# Patient Record
Sex: FEMALE | URBAN - METROPOLITAN AREA
[De-identification: names, ages, dates, MRNs, and addresses within clinical notes are randomized per-mention and may not be internally consistent; named-entity substitution may affect disease eponyms.]

---

## 2020-11-10 ENCOUNTER — HOSPITAL ENCOUNTER (OUTPATIENT)
Dept: LAB | Age: 80
Discharge: HOME | End: 2020-11-10

## 2020-11-10 LAB
ALBUMIN SERPL-MCNC: 2.8 G/DL (ref 3.5–5)
ALBUMIN/GLOB SERPL: 1 {RATIO} (ref 1.1–2.2)
ALP SERPL-CCNC: 100 U/L (ref 45–117)
ALT SERPL-CCNC: 13 U/L (ref 12–78)
ANION GAP SERPL CALC-SCNC: 4 MMOL/L (ref 5–15)
AST SERPL W P-5'-P-CCNC: 18 U/L (ref 15–37)
BASOPHILS # BLD: 0.1 K/UL (ref 0–0.1)
BASOPHILS NFR BLD: 1 % (ref 0–1)
BILIRUB SERPL-MCNC: 0.4 MG/DL (ref 0.2–1)
BUN SERPL-MCNC: 21 MG/DL (ref 6–20)
BUN/CREAT SERPL: 18 (ref 12–20)
CA-I BLD-MCNC: 8.7 MG/DL (ref 8.5–10.1)
CHLORIDE SERPL-SCNC: 107 MMOL/L (ref 97–108)
CO2 SERPL-SCNC: 33 MMOL/L (ref 21–32)
CREAT SERPL-MCNC: 1.14 MG/DL (ref 0.55–1.02)
DIFFERENTIAL METHOD BLD: ABNORMAL
EOSINOPHIL # BLD: 0.2 K/UL (ref 0–0.4)
EOSINOPHIL NFR BLD: 3 % (ref 0–7)
ERYTHROCYTE [DISTWIDTH] IN BLOOD BY AUTOMATED COUNT: 13.2 % (ref 11.5–14.5)
GLOBULIN SER CALC-MCNC: 2.7 G/DL (ref 2–4)
GLUCOSE SERPL-MCNC: 93 MG/DL (ref 65–100)
HCT VFR BLD AUTO: 37.5 % (ref 35–47)
HGB BLD-MCNC: 11.3 G/DL (ref 11.5–16)
IMM GRANULOCYTES # BLD AUTO: 0.1 K/UL (ref 0–0.04)
IMM GRANULOCYTES NFR BLD AUTO: 1 % (ref 0–0.5)
LYMPHOCYTES # BLD: 1.9 K/UL (ref 0.8–3.5)
LYMPHOCYTES NFR BLD: 28 % (ref 12–49)
MCH RBC QN AUTO: 30.1 PG (ref 26–34)
MCHC RBC AUTO-ENTMCNC: 30.1 G/DL (ref 30–36.5)
MCV RBC AUTO: 100 FL (ref 80–99)
MONOCYTES # BLD: 0.9 K/UL (ref 0–1)
MONOCYTES NFR BLD: 12 % (ref 5–13)
NEUTS SEG # BLD: 3.9 K/UL (ref 1.8–8)
NEUTS SEG NFR BLD: 55 % (ref 32–75)
PLATELET # BLD AUTO: 332 K/UL (ref 150–400)
PMV BLD AUTO: 11.9 FL (ref 8.9–12.9)
POTASSIUM SERPL-SCNC: 4 MMOL/L (ref 3.5–5.1)
PREALB SERPL-MCNC: 15 MG/DL (ref 20–40)
PROT SERPL-MCNC: 5.5 G/DL (ref 6.4–8.2)
RBC # BLD AUTO: 3.75 M/UL (ref 3.8–5.2)
SODIUM SERPL-SCNC: 144 MMOL/L (ref 136–145)
WBC # BLD AUTO: 6.9 K/UL (ref 3.6–11)

## 2020-11-10 PROCEDURE — 84134 ASSAY OF PREALBUMIN: CPT

## 2020-11-10 PROCEDURE — 85025 COMPLETE CBC W/AUTO DIFF WBC: CPT

## 2020-11-10 PROCEDURE — 80053 COMPREHEN METABOLIC PANEL: CPT

## 2020-11-12 ENCOUNTER — HOSPITAL ENCOUNTER (OUTPATIENT)
Dept: LAB | Age: 80
Discharge: HOME | End: 2020-11-12

## 2020-11-12 LAB — MAGNESIUM SERPL-MCNC: 1.7 MG/DL (ref 1.6–2.4)

## 2020-11-12 PROCEDURE — 83735 ASSAY OF MAGNESIUM: CPT

## 2020-11-17 ENCOUNTER — HOSPITAL ENCOUNTER (OUTPATIENT)
Dept: LAB | Age: 80
Discharge: HOME | End: 2020-11-17

## 2020-11-17 LAB
ANION GAP SERPL CALC-SCNC: 5 MMOL/L (ref 5–15)
APPEARANCE UR: CLEAR
BACTERIA URNS QL MICRO: NEGATIVE /HPF
BASOPHILS NFR BLD: 0 % (ref 0–1)
BILIRUB UR QL: NEGATIVE
BUN SERPL-MCNC: 30 MG/DL (ref 6–20)
BUN/CREAT SERPL: 20 (ref 12–20)
CA-I BLD-MCNC: 8.8 MG/DL (ref 8.5–10.1)
CHLORIDE SERPL-SCNC: 107 MMOL/L (ref 97–108)
CO2 SERPL-SCNC: 30 MMOL/L (ref 21–32)
COLOR UR: YELLOW
CREAT SERPL-MCNC: 1.51 MG/DL (ref 0.55–1.02)
DIFFERENTIAL METHOD BLD: ABNORMAL
EOSINOPHIL NFR BLD: 1 % (ref 0–7)
ERYTHROCYTE [DISTWIDTH] IN BLOOD BY AUTOMATED COUNT: 13.3 % (ref 11.5–14.5)
GLUCOSE SERPL-MCNC: 95 MG/DL (ref 65–100)
GLUCOSE UR STRIP.AUTO-MCNC: NEGATIVE MG/DL
HCT VFR BLD AUTO: 36.5 % (ref 35–47)
HGB BLD-MCNC: 11.2 G/DL (ref 11.5–16)
HGB UR QL STRIP: NEGATIVE
IMM GRANULOCYTES NFR BLD AUTO: 0 % (ref 0–0.5)
KETONES UR QL STRIP.AUTO: NEGATIVE MG/DL
LEUKOCYTE ESTERASE UR QL STRIP.AUTO: NEGATIVE
LYMPHOCYTES NFR BLD: 22 % (ref 12–49)
MCH RBC QN AUTO: 30.4 PG (ref 26–34)
MCHC RBC AUTO-ENTMCNC: 30.7 G/DL (ref 30–36.5)
MCV RBC AUTO: 99.2 FL (ref 80–99)
MONOCYTES NFR BLD: 10 % (ref 5–13)
NEUTS SEG NFR BLD: 65 % (ref 32–75)
NITRITE UR QL STRIP.AUTO: NEGATIVE
PH UR STRIP: 5 [PH] (ref 5–8)
PLATELET # BLD AUTO: 352 K/UL (ref 150–400)
PMV BLD AUTO: 11.9 FL (ref 8.9–12.9)
POTASSIUM SERPL-SCNC: 3.8 MMOL/L (ref 3.5–5.1)
PROT UR STRIP-MCNC: NEGATIVE MG/DL
RBC # BLD AUTO: 3.68 M/UL (ref 3.8–5.2)
RBC #/AREA URNS HPF: 1 /HPF (ref 0–5)
SODIUM SERPL-SCNC: 142 MMOL/L (ref 136–145)
SP GR UR REFRACTOMETRY: 1.01 (ref 1–1.03)
UROBILINOGEN UR QL STRIP.AUTO: 0.1 EU/DL (ref 0.1–1)
WBC # BLD AUTO: 10 K/UL (ref 3.6–11)
WBC URNS QL MICRO: 1 /HPF (ref 0–4)

## 2020-11-17 PROCEDURE — 87086 URINE CULTURE/COLONY COUNT: CPT

## 2020-11-17 PROCEDURE — 80048 BASIC METABOLIC PNL TOTAL CA: CPT

## 2020-11-17 PROCEDURE — 81001 URINALYSIS AUTO W/SCOPE: CPT

## 2020-11-17 PROCEDURE — 85025 COMPLETE CBC W/AUTO DIFF WBC: CPT

## 2020-11-17 PROCEDURE — 87077 CULTURE AEROBIC IDENTIFY: CPT

## 2020-11-17 PROCEDURE — 87186 SC STD MICRODIL/AGAR DIL: CPT

## 2020-11-19 LAB
BACTERIA SPEC CULT: ABNORMAL
COLONY COUNT,CNT: ABNORMAL
COLONY COUNT,CNT: ABNORMAL
SPECIAL REQUESTS,SREQ: ABNORMAL

## 2023-08-29 ENCOUNTER — OFFICE VISIT (OUTPATIENT)
Age: 83
End: 2023-08-29
Payer: MEDICARE

## 2023-08-29 VITALS
HEIGHT: 66 IN | HEART RATE: 56 BPM | DIASTOLIC BLOOD PRESSURE: 76 MMHG | OXYGEN SATURATION: 97 % | SYSTOLIC BLOOD PRESSURE: 128 MMHG

## 2023-08-29 DIAGNOSIS — R42 DIZZINESS AND GIDDINESS: Primary | ICD-10-CM

## 2023-08-29 DIAGNOSIS — R25.1 TREMOR OF HANDS AND FACE: ICD-10-CM

## 2023-08-29 PROCEDURE — G8400 PT W/DXA NO RESULTS DOC: HCPCS | Performed by: NURSE PRACTITIONER

## 2023-08-29 PROCEDURE — G8421 BMI NOT CALCULATED: HCPCS | Performed by: NURSE PRACTITIONER

## 2023-08-29 PROCEDURE — 99204 OFFICE O/P NEW MOD 45 MIN: CPT | Performed by: NURSE PRACTITIONER

## 2023-08-29 PROCEDURE — 4004F PT TOBACCO SCREEN RCVD TLK: CPT | Performed by: NURSE PRACTITIONER

## 2023-08-29 PROCEDURE — 1123F ACP DISCUSS/DSCN MKR DOCD: CPT | Performed by: NURSE PRACTITIONER

## 2023-08-29 PROCEDURE — G8427 DOCREV CUR MEDS BY ELIG CLIN: HCPCS | Performed by: NURSE PRACTITIONER

## 2023-08-29 PROCEDURE — 1090F PRES/ABSN URINE INCON ASSESS: CPT | Performed by: NURSE PRACTITIONER

## 2023-08-29 RX ORDER — ALBUTEROL SULFATE 90 UG/1
AEROSOL, METERED RESPIRATORY (INHALATION)
COMMUNITY
Start: 2020-07-16

## 2023-08-29 RX ORDER — ATORVASTATIN CALCIUM 20 MG/1
TABLET, FILM COATED ORAL
COMMUNITY
Start: 2023-08-14

## 2023-08-29 RX ORDER — FLUTICASONE PROPIONATE 50 MCG
SPRAY, SUSPENSION (ML) NASAL
COMMUNITY
Start: 2023-06-01

## 2023-08-29 RX ORDER — DIAZEPAM 5 MG/1
5 TABLET ORAL SEE ADMIN INSTRUCTIONS
Qty: 3 TABLET | Refills: 0 | Status: SHIPPED | OUTPATIENT
Start: 2023-08-29 | End: 2023-09-01

## 2023-08-29 RX ORDER — FUROSEMIDE 20 MG/1
TABLET ORAL
COMMUNITY
Start: 2023-06-08

## 2023-08-29 RX ORDER — MECLIZINE HYDROCHLORIDE 25 MG/1
TABLET ORAL
COMMUNITY
Start: 2023-08-14

## 2023-08-29 RX ORDER — PAROXETINE 10 MG/1
TABLET, FILM COATED ORAL
COMMUNITY
Start: 2023-07-05

## 2023-08-29 RX ORDER — PANTOPRAZOLE SODIUM 40 MG/1
TABLET, DELAYED RELEASE ORAL
COMMUNITY
Start: 2023-08-14

## 2023-08-29 ASSESSMENT — ENCOUNTER SYMPTOMS
SORE THROAT: 0
GASTROINTESTINAL NEGATIVE: 1
ALLERGIC/IMMUNOLOGIC NEGATIVE: 1
EYES NEGATIVE: 1
RESPIRATORY NEGATIVE: 1

## 2023-08-29 NOTE — PROGRESS NOTES
Subjective: Mitch Palencia   78 y.o.   1940     New Patient Visit  This is a 78 y.o. female who is here today to discuss issues with vertigo. She states she was seen by her PCP, and had a head CT she brought report with her which shows chronic microvascular changes. No acute findings. She states she has been seen by PT and has had vestibular therapy, but her symptoms have persisted despite this. Her daughter is also here with her ad states that the patient has lost about 40-50 pounds over the past 6 months. When she has dizzy spells she states that if she is not lying down she is dizzy, it will last all day. She denies room spinning, she states she feels more off balance, she states she will easily lose her balance. She has had falls related to this. Most recently 3 weeks ago she lost her balance and was able to sit down on the side of the bath tub and fell into the tub. She has been on meclizine as well without much relief. These dizzy spells has also been accompanied by nausea and vomiting. She does wear hearing aids, her last hearing test was about two years ago. Without her aids she cannot hear out of the left ear at all. Review of Systems  Review of Systems   Constitutional:  Positive for appetite change and fatigue. HENT:  Positive for hearing loss (wears hearing aids). Negative for sore throat. Eyes: Negative. Eyes feel tired    Respiratory: Negative. Cardiovascular: Negative. Gastrointestinal: Negative. Endocrine: Negative. Genitourinary: Negative. Musculoskeletal:  Positive for arthralgias, gait problem and myalgias. Skin: Negative. Allergic/Immunologic: Negative. Neurological:  Positive for dizziness and headaches. Hematological: Negative. Psychiatric/Behavioral: Negative. No past medical history on file. No past surgical history on file. No family history on file.   Social History     Tobacco Use    Smoking status: Not on file

## 2023-09-15 ENCOUNTER — HOSPITAL ENCOUNTER (OUTPATIENT)
Facility: HOSPITAL | Age: 83
End: 2023-09-15
Payer: MEDICARE

## 2023-09-15 DIAGNOSIS — R42 DIZZINESS AND GIDDINESS: ICD-10-CM

## 2023-09-15 DIAGNOSIS — R25.1 TREMOR OF HANDS AND FACE: ICD-10-CM

## 2023-09-15 PROCEDURE — 70553 MRI BRAIN STEM W/O & W/DYE: CPT

## 2023-09-15 PROCEDURE — 6360000004 HC RX CONTRAST MEDICATION: Performed by: NURSE PRACTITIONER

## 2023-09-15 PROCEDURE — A9577 INJ MULTIHANCE: HCPCS | Performed by: NURSE PRACTITIONER

## 2023-09-15 RX ADMIN — GADOBENATE DIMEGLUMINE 14 ML: 529 INJECTION, SOLUTION INTRAVENOUS at 13:48

## 2024-01-04 ENCOUNTER — TELEPHONE (OUTPATIENT)
Age: 84
End: 2024-01-04

## 2024-01-04 ENCOUNTER — OFFICE VISIT (OUTPATIENT)
Age: 84
End: 2024-01-04
Payer: MEDICARE

## 2024-01-04 VITALS
HEIGHT: 66 IN | WEIGHT: 158 LBS | DIASTOLIC BLOOD PRESSURE: 65 MMHG | RESPIRATION RATE: 14 BRPM | SYSTOLIC BLOOD PRESSURE: 158 MMHG | OXYGEN SATURATION: 96 % | BODY MASS INDEX: 25.39 KG/M2 | HEART RATE: 65 BPM

## 2024-01-04 DIAGNOSIS — G90.9 ANS (AUTONOMIC NERVOUS SYSTEM) DISEASE: ICD-10-CM

## 2024-01-04 DIAGNOSIS — I63.9 CEREBROVASCULAR ACCIDENT (CVA), UNSPECIFIED MECHANISM (HCC): Primary | ICD-10-CM

## 2024-01-04 DIAGNOSIS — D32.9 MENINGIOMA (HCC): ICD-10-CM

## 2024-01-04 DIAGNOSIS — R42 DIZZY: ICD-10-CM

## 2024-01-04 PROCEDURE — 99204 OFFICE O/P NEW MOD 45 MIN: CPT | Performed by: PSYCHIATRY & NEUROLOGY

## 2024-01-04 PROCEDURE — 1123F ACP DISCUSS/DSCN MKR DOCD: CPT | Performed by: PSYCHIATRY & NEUROLOGY

## 2024-01-04 ASSESSMENT — PATIENT HEALTH QUESTIONNAIRE - PHQ9
SUM OF ALL RESPONSES TO PHQ9 QUESTIONS 1 & 2: 0
SUM OF ALL RESPONSES TO PHQ QUESTIONS 1-9: 0
2. FEELING DOWN, DEPRESSED OR HOPELESS: 0
SUM OF ALL RESPONSES TO PHQ QUESTIONS 1-9: 0
SUM OF ALL RESPONSES TO PHQ QUESTIONS 1-9: 0
1. LITTLE INTEREST OR PLEASURE IN DOING THINGS: 0
SUM OF ALL RESPONSES TO PHQ QUESTIONS 1-9: 0

## 2024-01-04 NOTE — TELEPHONE ENCOUNTER
No PA required for ans testing [id number]  Patient has medicare as primary      Hold prior to testing   Flonase-3 days  Furosemide-1 day  Pantoprazole-3 days  Paroxetine-5 days

## 2024-01-04 NOTE — PATIENT INSTRUCTIONS
[place], [place] [facility] Test Result Communication    Test results are available in [facility].  [employer] is the patient portal into our electronic health record.  This feature allows patients to see diagnostic test results, immunizations, allergies, past medical and surgical history, current medications, and send messages directly to providers.  Our team members at the  can provide additional information and assist with registration.  The [facility] support team can be reached at [phone].    In some cases, a provider might need time to explain the results in detail during a follow-up appointment.  This might include additional information or context that will help patients understand the reason for next steps in the plan of care recommended by their provider.    If a patient chooses to receive diagnostic testing at an imaging center outside of the [facility] network, it is the patient's responsibility to bring the imaging report and disc to their [facility] follow-up appointment.    If the test results reveal anything that is particularly noteworthy, we will contact you to discuss the matter and, if necessary, schedule a follow-up appointment at an earlier date.    If you have not received your test results by [facility] or other communication within 7 days, please contact our office.  An inquiry can be sent to your provider using [facility].  Alternatively, appointments can be scheduled via telephone to review results.  If a follow-up appointment to review results has not been scheduled, Our [facility] office can be reached at [phone].  For appointments at our [place] or [place] office, please call [phone].       PRESCRIPTION REFILL POLICY    [facility]   Statement to Patients  April 1, 2014      In an effort to ensure the large volume of patient prescription refills is processed in the most efficient and expeditious

## 2024-01-04 NOTE — PROGRESS NOTES
[facility] at [facility] at [address] [place] [address] [place] [address] [place] [address] [phone] Office  [phone] Facsimile           Referring: Gómez Mao DO      Chief Complaint   Patient presents with    New Patient    Dizziness     Saw ENT for dizziness, had MRI done in Sept.  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  She and her daughter both provide history.  Her symptoms started over 6 months ago.  She was evaluated in [place].  She had an eye exam which was fine.  She had her blood work checked and that was fine.  She had a CT of the head performed and that was said to be normal as well.  She then saw ENT and had some repositioning maneuvers which did not help.  She tried Antivert which did not help.  She was sent for an MRI of the brain and that was thought to be fine as well.  She continues to be dizzy.  She describes the dizziness as feeling lightheaded.  She can feel that way even when she is sitting but primarily when she tries to stand up she feels lightheaded as though she may pass out.  She does not lose consciousness.  In the morning when she gets out of bed she has to sit on the side of the bed for a while and then get up and she says she walks like she is drunk.  She does smoke.  Her son has had a stroke.  Her sister has tremor and she also has an action type tremor and shakes when she eats.  Her head will shake as well.  Her daughter notes that about 20 years ago she saw a neurologist at [facility] and was found to have a small meningioma and they were told not to worry about it.    MRI of the brain performed 4 indication of dizziness and tremor performed September 15, 2023 is personally reviewed

## 2024-01-09 ENCOUNTER — TELEPHONE (OUTPATIENT)
Age: 84
End: 2024-01-09

## 2024-04-03 ENCOUNTER — PROCEDURE VISIT (OUTPATIENT)
Age: 84
End: 2024-04-03

## 2024-04-03 DIAGNOSIS — R42 DIZZINESS: Primary | ICD-10-CM

## 2024-04-04 NOTE — PROGRESS NOTES
[facility]  [address], [address]  [place] [address]    Patient ID:  Juanis Barcenas  [id number]  83 y.o.  1940     REFERRED BY: Jasmyne Thomas MD  PCP: Gómez Mao DO    Date of Testin2024       Indication/History:    Episodes of dizziness/lightheadedness. (+) tremors    Patient is coming for syncope/autonomic dysfunction evaluation.    Medications taken 48 hrs before the test: None     Procedure: This Autonomic Nervous System (ANS) testing is performed by utilizing [facility] Work Autonomic System, with established protocol.  Multiple procedures performed: Heart rate response to deep breathing (HRDB), Valsalva ratio, Heart rate and BP response to head up tilt (HUT), and Quantitative sudomotor axon reflex testing (QSWEAT) .     Result:  Heart response to deep  breathing (HRDB): 2 series of 6 cycles were performed and the mean of 6 consecutive cycles was obtained. Average HR difference was 9.53 and E:I ratio was 1.19. Normal response  Heart rate response to Valsalva maneuver:  Rflo-[id number] BP to Valsalva was measured and BP response in all 4 phases was normal. Heart response was the opposite of BP, a normal response. ( VR = 1.38 )  HUT (head-up tilt) : [id number]-fq-vrls BP and HR were measured, up to 15 minutes post tilt.  There is an immediate and transient drop of BP from baseline BP of 190/78 to lowest BP of 150/78 within 15 secs of tilt which rebounded within less than 1 min of tilt associated with dizziness.  SUDOMOTOR: QSWEAT response showed relatively preserved sweat production in all 4 localities (forearm, proximal leg, distal leg, foot) of the right upper and lower limbs, comparing patient to age group.     Impression:   ABNORMAL    There is an immediate and transient drop of BP (SBP> 40 mm Hg) within 15 secs of tilt which rebounded within less than 1 min of tilt associated with symptoms consistent with Initial orthostatic hypotension, non-neurogenic in

## 2024-04-15 ENCOUNTER — TELEPHONE (OUTPATIENT)
Age: 84
End: 2024-04-15

## 2024-05-20 ENCOUNTER — TELEPHONE (OUTPATIENT)
Age: 84
End: 2024-05-20

## 2024-05-20 NOTE — TELEPHONE ENCOUNTER
Returned daughter's call and left a message that the appointment on 5/23/24 has been taken but there was one on 5/22/2024 with Dayana at 9:30 AM. I left a message with the appointment information 5/22/2024 arrive at 9:15 am.

## 2024-05-20 NOTE — TELEPHONE ENCOUNTER
Patient's daughter, Minh, would like a call back. She will accept 05.23.24 appt. If it's still available.

## 2024-05-22 ENCOUNTER — OFFICE VISIT (OUTPATIENT)
Age: 84
End: 2024-05-22
Payer: MEDICARE

## 2024-05-22 VITALS
RESPIRATION RATE: 22 BRPM | DIASTOLIC BLOOD PRESSURE: 80 MMHG | OXYGEN SATURATION: 96 % | SYSTOLIC BLOOD PRESSURE: 142 MMHG | HEART RATE: 60 BPM

## 2024-05-22 DIAGNOSIS — G25.0 ESSENTIAL TREMOR: Primary | ICD-10-CM

## 2024-05-22 PROCEDURE — 1123F ACP DISCUSS/DSCN MKR DOCD: CPT

## 2024-05-22 PROCEDURE — 99215 OFFICE O/P EST HI 40 MIN: CPT

## 2024-05-22 RX ORDER — TOPIRAMATE 25 MG/1
TABLET ORAL
Qty: 60 TABLET | Refills: 3 | Status: SHIPPED | OUTPATIENT
Start: 2024-05-22

## 2024-05-22 NOTE — PROGRESS NOTES
The dizziness is making her fall, had a fall the last 2 nights   She doesn't use the walker at home just when she is out and about  She fell one time at home during the night she didn't turn the light on  The other time it was during the day   She furniture searching and walk walking       Test Results:     ANS   Doppler      
today.  Allergies   Allergen Reactions    Sulfa Antibiotics Rash       Current Outpatient Medications   Medication Sig Dispense Refill    topiramate (TOPAMAX) 25 MG tablet Take 1 pill nightly for 1 week then increase the dose to 2 pills nightly. 60 tablet 3    atorvastatin (LIPITOR) 20 MG tablet       fluticasone (FLONASE) 50 MCG/ACT nasal spray USE 1 SPRAY IN EACH NOSTRIL ONCE A DAY 90      furosemide (LASIX) 20 MG tablet TAKE 1 TABLET BY MOUTH ONCE A DAY AS NEEDED FOR LOWER EXTREMITY SWELLING      pantoprazole (PROTONIX) 40 MG tablet       PARoxetine (PAXIL) 10 MG tablet       albuterol sulfate HFA (VENTOLIN HFA) 108 (90 Base) MCG/ACT inhaler        No current facility-administered medications for this visit.        Social History     Tobacco Use   Smoking Status Every Day    Types: Cigarettes   Smokeless Tobacco Never       Past Medical History:   Diagnosis Date    DVT (deep venous thrombosis) (HCC)     High cholesterol     Perforated ulcer (HCC)        Past Surgical History:   Procedure Laterality Date    IVC FILTER INSERTION      SHOULDER ARTHROPLASTY Bilateral        History reviewed. No pertinent family history.    Social History     Socioeconomic History    Marital status:      Spouse name: None    Number of children: None    Years of education: None    Highest education level: None   Tobacco Use    Smoking status: Every Day     Types: Cigarettes    Smokeless tobacco: Never   Vaping Use    Vaping Use: Never used   Substance and Sexual Activity    Alcohol use: Not Currently     Alcohol/week: 7.0 - 14.0 standard drinks of alcohol     Types: 7 - 14 Glasses of wine per week    Drug use: Not Currently       Review of Systems   Constitutional: Negative.    Musculoskeletal:  Positive for gait problem.        Falls   Neurological:  Positive for tremors and light-headedness.         Remainder of comprehensive review is negative.     Physical Exam :    BP (!) 142/80 (Site: Left Upper Arm, Position: Sitting,

## 2024-07-22 ENCOUNTER — TELEPHONE (OUTPATIENT)
Age: 84
End: 2024-07-22

## 2024-07-22 NOTE — TELEPHONE ENCOUNTER
Patient daughter requesting a call to discuss medication:    Topiramate    She stated the patient not doing well with this medication

## 2024-07-24 NOTE — TELEPHONE ENCOUNTER
Pts daughter stated the topamax was just not a good fit for her she tried it for about 2 weeks and it seemed to make her symptoms worse like the shaking. So she has been off of it for almost 3 weeks now and still has the same symptoms. Is there something else she can try?  Per the daughter she also wants to check and see if she has cervical dystonia. Her sister also has that and gets botox for it, wanted to know if that was something we could treat. I did tell her I would mention it maybe we could put in a referral for Dr. FLOYD to assess for the cervical dystonia?  Please advise

## 2024-07-25 DIAGNOSIS — G25.0 ESSENTIAL TREMOR: Primary | ICD-10-CM

## 2024-07-25 RX ORDER — PRIMIDONE 50 MG/1
TABLET ORAL
Qty: 120 TABLET | Refills: 2 | Status: SHIPPED | OUTPATIENT
Start: 2024-07-25

## 2024-07-31 NOTE — TELEPHONE ENCOUNTER
Spoke with patient's daughter and informed Dayana has sent Primidone,which replaces Topamax, for the tremor. Daughter verbalized understanding.

## 2024-09-25 ENCOUNTER — TELEPHONE (OUTPATIENT)
Age: 84
End: 2024-09-25

## 2024-09-25 NOTE — TELEPHONE ENCOUNTER
Patient daughter requesting a call regarding her mothers tremors getting worse. Williams was very upset and crying.

## 2024-12-12 DIAGNOSIS — G25.0 ESSENTIAL TREMOR: ICD-10-CM

## 2024-12-13 RX ORDER — PRIMIDONE 50 MG/1
TABLET ORAL
Qty: 120 TABLET | Refills: 2 | Status: SHIPPED | OUTPATIENT
Start: 2024-12-13